# Patient Record
Sex: MALE | Race: WHITE | NOT HISPANIC OR LATINO | URBAN - METROPOLITAN AREA
[De-identification: names, ages, dates, MRNs, and addresses within clinical notes are randomized per-mention and may not be internally consistent; named-entity substitution may affect disease eponyms.]

---

## 2017-05-21 ENCOUNTER — EMERGENCY (EMERGENCY)
Facility: HOSPITAL | Age: 52
LOS: 0 days | Discharge: HOME | End: 2017-05-21
Attending: EMERGENCY MEDICINE | Admitting: EMERGENCY MEDICINE
Payer: COMMERCIAL

## 2017-05-21 VITALS
DIASTOLIC BLOOD PRESSURE: 78 MMHG | SYSTOLIC BLOOD PRESSURE: 120 MMHG | HEART RATE: 88 BPM | RESPIRATION RATE: 17 BRPM | TEMPERATURE: 99 F | OXYGEN SATURATION: 100 %

## 2017-05-21 VITALS — HEIGHT: 68 IN | WEIGHT: 160.06 LBS

## 2017-05-21 DIAGNOSIS — S80.869A INSECT BITE (NONVENOMOUS), UNSPECIFIED LOWER LEG, INITIAL ENCOUNTER: ICD-10-CM
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
## 2017-05-21 DIAGNOSIS — S80.861A INSECT BITE (NONVENOMOUS), RIGHT LOWER LEG, INITIAL ENCOUNTER: ICD-10-CM
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
## 2017-05-21 PROCEDURE — 99283 EMERGENCY DEPT VISIT LOW MDM: CPT | Mod: 25

## 2017-05-21 RX ADMIN — Medication 100 MILLIGRAM(S): at 22:28

## 2017-05-21 NOTE — ED STATDOCS - SKIN, MLM
skin normal color for race, warm, dry and intact. +small erythematous superficial punctate puncture site on right thigh

## 2017-05-21 NOTE — ED ADULT NURSE NOTE - OBJECTIVE STATEMENT
Patient was treated, evaluated and discharged by intake provider. Please see provider's notes for assessment. Patient was treated, evaluated and discharged by intake provider. Please see provider's notes for assessment. Patient was not seen by an ED RN.

## 2018-05-22 ENCOUNTER — EMERGENCY (EMERGENCY)
Facility: HOSPITAL | Age: 53
LOS: 0 days | Discharge: HOME | End: 2018-05-22
Attending: EMERGENCY MEDICINE | Admitting: EMERGENCY MEDICINE
Payer: COMMERCIAL

## 2018-05-22 VITALS — WEIGHT: 160.06 LBS | HEIGHT: 68 IN

## 2018-05-22 VITALS
HEART RATE: 80 BPM | SYSTOLIC BLOOD PRESSURE: 140 MMHG | DIASTOLIC BLOOD PRESSURE: 94 MMHG | RESPIRATION RATE: 15 BRPM | TEMPERATURE: 98 F | OXYGEN SATURATION: 100 %

## 2018-05-22 DIAGNOSIS — F43.25 ADJUSTMENT DISORDER WITH MIXED DISTURBANCE OF EMOTIONS AND CONDUCT: ICD-10-CM

## 2018-05-22 DIAGNOSIS — R69 ILLNESS, UNSPECIFIED: ICD-10-CM

## 2018-05-22 DIAGNOSIS — R45.851 SUICIDAL IDEATIONS: ICD-10-CM

## 2018-05-22 DIAGNOSIS — F32.9 MAJOR DEPRESSIVE DISORDER, SINGLE EPISODE, UNSPECIFIED: ICD-10-CM

## 2018-05-22 DIAGNOSIS — Z79.899 OTHER LONG TERM (CURRENT) DRUG THERAPY: ICD-10-CM

## 2018-05-22 LAB
AMPHET UR-MCNC: NEGATIVE — SIGNIFICANT CHANGE UP
ANION GAP SERPL CALC-SCNC: 8 MMOL/L — SIGNIFICANT CHANGE UP (ref 5–17)
APAP SERPL-MCNC: <2 UG/ML — LOW (ref 10–30)
APPEARANCE UR: CLEAR — SIGNIFICANT CHANGE UP
BARBITURATES UR SCN-MCNC: NEGATIVE — SIGNIFICANT CHANGE UP
BASOPHILS # BLD AUTO: 0.04 K/UL — SIGNIFICANT CHANGE UP (ref 0–0.2)
BASOPHILS NFR BLD AUTO: 0.5 % — SIGNIFICANT CHANGE UP (ref 0–2)
BENZODIAZ UR-MCNC: NEGATIVE — SIGNIFICANT CHANGE UP
BILIRUB UR-MCNC: NEGATIVE — SIGNIFICANT CHANGE UP
BUN SERPL-MCNC: 12 MG/DL — SIGNIFICANT CHANGE UP (ref 7–23)
CALCIUM SERPL-MCNC: 8.9 MG/DL — SIGNIFICANT CHANGE UP (ref 8.5–10.1)
CHLORIDE SERPL-SCNC: 105 MMOL/L — SIGNIFICANT CHANGE UP (ref 96–108)
CO2 SERPL-SCNC: 28 MMOL/L — SIGNIFICANT CHANGE UP (ref 22–31)
COCAINE METAB.OTHER UR-MCNC: NEGATIVE — SIGNIFICANT CHANGE UP
COLOR SPEC: YELLOW — SIGNIFICANT CHANGE UP
CREAT SERPL-MCNC: 0.93 MG/DL — SIGNIFICANT CHANGE UP (ref 0.5–1.3)
DIFF PNL FLD: NEGATIVE — SIGNIFICANT CHANGE UP
EOSINOPHIL # BLD AUTO: 0.18 K/UL — SIGNIFICANT CHANGE UP (ref 0–0.5)
EOSINOPHIL NFR BLD AUTO: 2.3 % — SIGNIFICANT CHANGE UP (ref 0–6)
ETHANOL SERPL-MCNC: <10 MG/DL — SIGNIFICANT CHANGE UP (ref 0–10)
GLUCOSE SERPL-MCNC: 85 MG/DL — SIGNIFICANT CHANGE UP (ref 70–99)
GLUCOSE UR QL: NEGATIVE MG/DL — SIGNIFICANT CHANGE UP
HCT VFR BLD CALC: 43.7 % — SIGNIFICANT CHANGE UP (ref 39–50)
HGB BLD-MCNC: 15.1 G/DL — SIGNIFICANT CHANGE UP (ref 13–17)
IMM GRANULOCYTES NFR BLD AUTO: 0.1 % — SIGNIFICANT CHANGE UP (ref 0–1.5)
KETONES UR-MCNC: NEGATIVE — SIGNIFICANT CHANGE UP
LEUKOCYTE ESTERASE UR-ACNC: NEGATIVE — SIGNIFICANT CHANGE UP
LYMPHOCYTES # BLD AUTO: 2.08 K/UL — SIGNIFICANT CHANGE UP (ref 1–3.3)
LYMPHOCYTES # BLD AUTO: 26.9 % — SIGNIFICANT CHANGE UP (ref 13–44)
MCHC RBC-ENTMCNC: 28.9 PG — SIGNIFICANT CHANGE UP (ref 27–34)
MCHC RBC-ENTMCNC: 34.6 GM/DL — SIGNIFICANT CHANGE UP (ref 32–36)
MCV RBC AUTO: 83.7 FL — SIGNIFICANT CHANGE UP (ref 80–100)
METHADONE UR-MCNC: NEGATIVE — SIGNIFICANT CHANGE UP
MONOCYTES # BLD AUTO: 0.65 K/UL — SIGNIFICANT CHANGE UP (ref 0–0.9)
MONOCYTES NFR BLD AUTO: 8.4 % — SIGNIFICANT CHANGE UP (ref 2–14)
NEUTROPHILS # BLD AUTO: 4.76 K/UL — SIGNIFICANT CHANGE UP (ref 1.8–7.4)
NEUTROPHILS NFR BLD AUTO: 61.8 % — SIGNIFICANT CHANGE UP (ref 43–77)
NITRITE UR-MCNC: NEGATIVE — SIGNIFICANT CHANGE UP
NRBC # BLD: 0 /100 WBCS — SIGNIFICANT CHANGE UP (ref 0–0)
OPIATES UR-MCNC: NEGATIVE — SIGNIFICANT CHANGE UP
PCP SPEC-MCNC: SIGNIFICANT CHANGE UP
PCP UR-MCNC: NEGATIVE — SIGNIFICANT CHANGE UP
PH UR: 7 — SIGNIFICANT CHANGE UP (ref 5–8)
PLATELET # BLD AUTO: 252 K/UL — SIGNIFICANT CHANGE UP (ref 150–400)
POTASSIUM SERPL-MCNC: 3.8 MMOL/L — SIGNIFICANT CHANGE UP (ref 3.5–5.3)
POTASSIUM SERPL-SCNC: 3.8 MMOL/L — SIGNIFICANT CHANGE UP (ref 3.5–5.3)
PROT UR-MCNC: NEGATIVE MG/DL — SIGNIFICANT CHANGE UP
RBC # BLD: 5.22 M/UL — SIGNIFICANT CHANGE UP (ref 4.2–5.8)
RBC # FLD: 11.9 % — SIGNIFICANT CHANGE UP (ref 10.3–14.5)
SALICYLATES SERPL-MCNC: <1.7 MG/DL — LOW (ref 2.8–20)
SODIUM SERPL-SCNC: 141 MMOL/L — SIGNIFICANT CHANGE UP (ref 135–145)
SP GR SPEC: 1.01 — SIGNIFICANT CHANGE UP (ref 1.01–1.02)
THC UR QL: NEGATIVE — SIGNIFICANT CHANGE UP
UROBILINOGEN FLD QL: NEGATIVE MG/DL — SIGNIFICANT CHANGE UP
WBC # BLD: 7.72 K/UL — SIGNIFICANT CHANGE UP (ref 3.8–10.5)
WBC # FLD AUTO: 7.72 K/UL — SIGNIFICANT CHANGE UP (ref 3.8–10.5)

## 2018-05-22 PROCEDURE — 71045 X-RAY EXAM CHEST 1 VIEW: CPT | Mod: 26

## 2018-05-22 PROCEDURE — 90792 PSYCH DIAG EVAL W/MED SRVCS: CPT

## 2018-05-22 PROCEDURE — 99283 EMERGENCY DEPT VISIT LOW MDM: CPT

## 2018-05-22 PROCEDURE — 93010 ELECTROCARDIOGRAM REPORT: CPT

## 2018-05-22 NOTE — ED ADULT NURSE NOTE - OBJECTIVE STATEMENT
C/O suicidal thought and depression worsened the last couple of days. Patient reports he had an argument the weekend, decided to leave and missed his daughter amira. Patient reports "at that time it seemed like it was better if I did not go". Patient reports regretting decision. Reports lately he has been thinking "it would be better if I wasn't around anymore". Denies plan, or previous attempts to hurt self. Reports he has not been able to sleep ok lately, he has been having headaches. EKG completed, wife at bedside, took belongings, patient wanded as protocol, 1:1 in progress. Pending psych consult.

## 2018-05-22 NOTE — ED PROVIDER NOTE - OBJECTIVE STATEMENT
53M no PMH p/w suicidal ideation in setting of significant life stressors (financial stress, arguing with spouse). 4 days ago left home and went to his [place] [facility], started having suicidal thoughts of shooting himself with a gun (though does not have access to any firearms), returned home the following evening and wife said she did not want him to come back so has been staying with friends since then. Notes over the past few months has had 3-4 episodes of transient sharp chest pains in various areas of his chest and back a/w SOB while under emotional stress. No exertional symptoms. No edema. No known cardiac hx. Has an appointment with a cardiologist at beginning of June for evaluation. No hx of prior suicide attempts.

## 2018-05-22 NOTE — ED ADULT NURSE NOTE - CHIEF COMPLAINT QUOTE
pt reports needing a psych eval , reporting hopelessness , depression , panic attack , impulsive behavior , marital issues , denies plan

## 2018-05-22 NOTE — ED BEHAVIORAL HEALTH ASSESSMENT NOTE - RISK ASSESSMENT
min risk of self harm, no h/o violence or self harm, maladaptive coping skills but insightful and willing to get help.

## 2018-05-22 NOTE — ED BEHAVIORAL HEALTH ASSESSMENT NOTE - DESCRIPTION
Cooperative well engaged, good eye contact, soft spoken upset, anxious depression denies SI/HI/Psychosis joie insight and judgement are fair to good, no evidence of agitation or acute psychiatric concerns. 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

## 2018-05-22 NOTE — ED BEHAVIORAL HEALTH ASSESSMENT NOTE - PATIENT'S CHIEF COMPLAINT
"She doesn't want me back, I am afraid I will have to start all over...I said I Might as well kill myself".

## 2018-05-22 NOTE — ED BEHAVIORAL HEALTH ASSESSMENT NOTE - SUMMARY
53 yowmm, second marriage for past 1 yr, with woman who has 2 children, now his step children ages 13 and 8, employed, PPH anger management issues, in therapy after first wife , from [id number], no h/o psych admission's, psycho pharm, SA, self harm, violence or arrests, no PMH but recent h/o cardiac "tightness" in Chest vs Anxiety, bib wife after she suggested after he made suicidal statement to her.  Pt reports he did not mean when he said he had nothing to live for and said it to see his wife again since he has not seen since the communions day.  Pt is regretful for past behavior and is anxious his relationship may be over for good.  Reports he is willing to seek help for his anger and coping skills and possibly medication for anxiety.  Pt is not an imminent danger to self or others and is cleared psychiatrically for discharge.  Case reviewed with DR Marin.

## 2018-05-22 NOTE — SBIRT NOTE. - NSSBIRTFULLSCREEN_GEN_A_ED_FT
Meeting with patient attempted however Full Screen Not Performed due to    Consult ordered - Psychiatry, Adult; Health  will wait for clearance from [facility] Psychiatry Team

## 2018-05-22 NOTE — ED STATDOCS - PROGRESS NOTE DETAILS
Ayse Nicole: 52 y/o male with no relevant PMHx presents to the ED c/o suicidal thoughts x4 days, gradual worsening. Pt states he has never experienced these thoughts in the past. +SOB and CP in the past few days, not currently having sx in ED. Pt notes 4 days ago he had an argument with wife and left, causing him to miss his daughter's communion the next day. He states he was angry with himself for his actions. Hx of anger management, no violent actions toward others. Denies having a plan to hurt himself. Denies tobacco or drug use. Occasional ETOH use. Consistently taking tylenol PM for sleep. Will send pt to main ED for further evaluation.

## 2018-05-22 NOTE — ED BEHAVIORAL HEALTH ASSESSMENT NOTE - HPI (INCLUDE ILLNESS QUALITY, SEVERITY, DURATION, TIMING, CONTEXT, MODIFYING FACTORS, ASSOCIATED SIGNS AND SYMPTOMS)
53 yowmm, second  for past 1 yr, with woman who has 2 children, now his step children ages 13 and 8, employed, PPH anger management issues, in therapy after first wife , from [id number], no h/o psych admission's, psycho pharm, SA, self harm, violence or arrests, no PMH but recent h/o cardiac "tightness" in Chest vs Anxiety, bib wife after she suggested after he made suicidal statement to her.  Pt reports feeling remorse and guilt   and leaving their home following argument with wife, on day of his step daughter's communion.  Pt felt it was the right thing to do at the time, now feels like he broke the relationship and is irreparable.  Pt admits to suicidal statement, denies he meant it and feels was a way to see her again, after she told him to leave.  Pt denies plan or intent and denies SI.  Pt admits he need to get help for controlling behavior which he has insight into and often get angry about things being out of place.  Pt states adjusting to 2 kids, which he never had and a dog is difficult and reports anxiety and anger directed toward wife.  Wife angry at Pt behavior at home, punching walls and cursing in front of her children and has since asked him to leave and get help.  Pt attempted treatment but could not get appt until July.  Pt drinks 4-5x week and denies abuse.  Prescribed Xanax in past but wife reports Pt developed abuse with it.  Wife reports no known h/o self harm or safety concerns for Pt but frightened by Pt anger.  Pt denies HI/AH/VH perceptual disturbances or joie.  Pt has family h/o depression.  Pt does not own firearms.

## 2018-05-22 NOTE — ED PROVIDER NOTE - MEDICAL DECISION MAKING DETAILS
53M with suicidal thoughts in setting of multiple life stressors, intermittent sharp chest pains a/w emotional stress. No cardiac hx, well appearing with normal exam. Low risk for ACS (no risk factors or hx). Will obtain ekg, cxr, psych screening labs, Psych evaluation. 53M with suicidal thoughts in setting of multiple life stressors, intermittent sharp chest pains c/w emotional stress. No cardiac hx, well appearing with normal exam. Low risk for ACS (no risk factors or hx). Will obtain ekg, cxr, psych screening labs, Psych evaluation.

## 2020-10-28 NOTE — ED STATDOCS - OBJECTIVE STATEMENT
Pt. is a 53 yo M presenting after removal of a tick off his body an hour ago.  Pt. states he believes the tick attached to his skin less than 24 hours ago [place]  Pt. saved tick which was a deer tick but it was not engorged and was easily removed.  No other symptoms.  Came to ER to see if he needed prophylaxis.
(4) rarely moist

## 2025-04-10 NOTE — ED BEHAVIORAL HEALTH ASSESSMENT NOTE - NS ED BHA MSE SPEECH ARTICULATION
- of right big toe, localized   - trial gabapentin, check A1c though unlikely peripheral neuropathy    Normal